# Patient Record
Sex: FEMALE | Race: WHITE | HISPANIC OR LATINO
[De-identification: names, ages, dates, MRNs, and addresses within clinical notes are randomized per-mention and may not be internally consistent; named-entity substitution may affect disease eponyms.]

---

## 2017-05-13 ENCOUNTER — MESSAGE (OUTPATIENT)
Age: 37
End: 2017-05-13

## 2017-05-13 DIAGNOSIS — R39.9 UNSPECIFIED SYMPTOMS AND SIGNS INVOLVING THE GENITOURINARY SYSTEM: ICD-10-CM

## 2017-05-13 RX ORDER — NITROFURANTOIN (MONOHYDRATE/MACROCRYSTALS) 25; 75 MG/1; MG/1
100 CAPSULE ORAL
Qty: 14 | Refills: 0 | Status: ACTIVE | COMMUNITY
Start: 2017-05-13 | End: 1900-01-01

## 2018-01-31 ENCOUNTER — EMERGENCY (EMERGENCY)
Facility: HOSPITAL | Age: 38
LOS: 1 days | Discharge: HOME | End: 2018-01-31
Attending: EMERGENCY MEDICINE | Admitting: EMERGENCY MEDICINE
Payer: COMMERCIAL

## 2018-01-31 VITALS
OXYGEN SATURATION: 99 % | DIASTOLIC BLOOD PRESSURE: 54 MMHG | HEART RATE: 80 BPM | SYSTOLIC BLOOD PRESSURE: 110 MMHG | RESPIRATION RATE: 16 BRPM | TEMPERATURE: 98 F

## 2018-01-31 VITALS
DIASTOLIC BLOOD PRESSURE: 68 MMHG | OXYGEN SATURATION: 100 % | SYSTOLIC BLOOD PRESSURE: 118 MMHG | HEART RATE: 78 BPM | RESPIRATION RATE: 16 BRPM

## 2018-01-31 PROCEDURE — 99284 EMERGENCY DEPT VISIT MOD MDM: CPT | Mod: 25

## 2018-01-31 PROCEDURE — 93010 ELECTROCARDIOGRAM REPORT: CPT

## 2018-01-31 RX ORDER — KETOROLAC TROMETHAMINE 30 MG/ML
30 SYRINGE (ML) INJECTION ONCE
Qty: 0 | Refills: 0 | Status: DISCONTINUED | OUTPATIENT
Start: 2018-01-31 | End: 2018-01-31

## 2018-01-31 RX ADMIN — Medication 30 MILLIGRAM(S): at 19:38

## 2018-01-31 NOTE — ED PROVIDER NOTE - ATTENDING CONTRIBUTION TO CARE
Seen and examined, atraumatic neck pain, able to flex and rotate 45 degrees bilat without pain, +pain with full rotation to R, occ. pain to L chest, transient, lasting seconds, sharp, occurs at rest. Ext. exam strength 5/5 bilat but states occ. arms feel "weak" denies numbness, normal sensory to exam, good distal pulses.

## 2018-01-31 NOTE — ED ADULT TRIAGE NOTE - CHIEF COMPLAINT QUOTE
Pt c/o right arm and neck pain as well as bilateral hand weakness x 2 days. NAD, pt appears comfortable.

## 2018-01-31 NOTE — ED PROVIDER NOTE - MEDICAL DECISION MAKING DETAILS
37 yr old female with hx of Type 1 DM, anxiety presents c/o upper right sided neck pain, with bilateral "hand weakness" x 2 days. Pt also admits to left sided chest stabbing pain, while waiting in the ED, lasting for few secs, no associated symptoms. Pt reports similar episode of chest pain last week.: neurologically intact, no bony tenderness, EKG nsr, pain control, fu with pmd.

## 2018-01-31 NOTE — ED PROVIDER NOTE - PHYSICAL EXAMINATION
spine- no bony tenderness, positive ROM  Neck: positive ROM, no point tenderness   5/5 strength to b/l extremities, no decrease in sensation  Positive strong good pulses in all extremities.

## 2018-01-31 NOTE — ED PROVIDER NOTE - OBJECTIVE STATEMENT
37 yr old female with hx of Type 1 DM, anxiety presents c/o upper right sided neck pain, with bilateral "hand weakness" x 2 days. Pt also admits to left sided chest stabbing pain, while waiting in the ED, lasting for few secs, no associated symptoms. Pt reports similar episode of chest pain last week. Pt denies any other symptoms at this time. Pt has not taking any pain meds today.

## 2019-03-21 NOTE — ED PROVIDER NOTE - PSYCHIATRIC, MLM
Alert and oriented to person, place, time/situation. normal mood and affect. no apparent risk to self or others. Yes

## 2022-03-09 ENCOUNTER — EMERGENCY (EMERGENCY)
Facility: HOSPITAL | Age: 42
LOS: 1 days | Discharge: HOME | End: 2022-03-09
Attending: STUDENT IN AN ORGANIZED HEALTH CARE EDUCATION/TRAINING PROGRAM | Admitting: STUDENT IN AN ORGANIZED HEALTH CARE EDUCATION/TRAINING PROGRAM
Payer: COMMERCIAL

## 2022-03-09 VITALS
SYSTOLIC BLOOD PRESSURE: 118 MMHG | TEMPERATURE: 98 F | HEART RATE: 65 BPM | DIASTOLIC BLOOD PRESSURE: 81 MMHG | OXYGEN SATURATION: 100 % | RESPIRATION RATE: 16 BRPM

## 2022-03-09 VITALS
RESPIRATION RATE: 16 BRPM | SYSTOLIC BLOOD PRESSURE: 109 MMHG | TEMPERATURE: 98 F | DIASTOLIC BLOOD PRESSURE: 76 MMHG | OXYGEN SATURATION: 100 % | HEART RATE: 83 BPM

## 2022-03-09 DIAGNOSIS — Z98.890 OTHER SPECIFIED POSTPROCEDURAL STATES: Chronic | ICD-10-CM

## 2022-03-09 PROBLEM — F41.9 ANXIETY DISORDER, UNSPECIFIED: Chronic | Status: ACTIVE | Noted: 2018-01-31

## 2022-03-09 PROBLEM — E11.9 TYPE 2 DIABETES MELLITUS WITHOUT COMPLICATIONS: Chronic | Status: ACTIVE | Noted: 2018-01-31

## 2022-03-09 PROBLEM — F32.9 MAJOR DEPRESSIVE DISORDER, SINGLE EPISODE, UNSPECIFIED: Chronic | Status: ACTIVE | Noted: 2018-01-31

## 2022-03-09 LAB
ALBUMIN SERPL ELPH-MCNC: 4.4 G/DL — SIGNIFICANT CHANGE UP (ref 3.3–5)
ALP SERPL-CCNC: 56 U/L — SIGNIFICANT CHANGE UP (ref 40–120)
ALT FLD-CCNC: 10 U/L — SIGNIFICANT CHANGE UP (ref 4–33)
ANION GAP SERPL CALC-SCNC: 12 MMOL/L — SIGNIFICANT CHANGE UP (ref 7–14)
AST SERPL-CCNC: 12 U/L — SIGNIFICANT CHANGE UP (ref 4–32)
BASOPHILS # BLD AUTO: 0.04 K/UL — SIGNIFICANT CHANGE UP (ref 0–0.2)
BASOPHILS NFR BLD AUTO: 0.6 % — SIGNIFICANT CHANGE UP (ref 0–2)
BILIRUB SERPL-MCNC: <0.2 MG/DL — SIGNIFICANT CHANGE UP (ref 0.2–1.2)
BUN SERPL-MCNC: 15 MG/DL — SIGNIFICANT CHANGE UP (ref 7–23)
CALCIUM SERPL-MCNC: 9.8 MG/DL — SIGNIFICANT CHANGE UP (ref 8.4–10.5)
CHLORIDE SERPL-SCNC: 101 MMOL/L — SIGNIFICANT CHANGE UP (ref 98–107)
CO2 SERPL-SCNC: 25 MMOL/L — SIGNIFICANT CHANGE UP (ref 22–31)
CREAT SERPL-MCNC: 0.77 MG/DL — SIGNIFICANT CHANGE UP (ref 0.5–1.3)
EGFR: 99 ML/MIN/1.73M2 — SIGNIFICANT CHANGE UP
EOSINOPHIL # BLD AUTO: 0.12 K/UL — SIGNIFICANT CHANGE UP (ref 0–0.5)
EOSINOPHIL NFR BLD AUTO: 1.7 % — SIGNIFICANT CHANGE UP (ref 0–6)
GLUCOSE SERPL-MCNC: 188 MG/DL — HIGH (ref 70–99)
HCG SERPL-ACNC: <5 MIU/ML — SIGNIFICANT CHANGE UP
HCT VFR BLD CALC: 39.1 % — SIGNIFICANT CHANGE UP (ref 34.5–45)
HGB BLD-MCNC: 13.2 G/DL — SIGNIFICANT CHANGE UP (ref 11.5–15.5)
IANC: 3.97 K/UL — SIGNIFICANT CHANGE UP (ref 1.5–8.5)
IMM GRANULOCYTES NFR BLD AUTO: 0.3 % — SIGNIFICANT CHANGE UP (ref 0–1.5)
LIDOCAIN IGE QN: 31 U/L — SIGNIFICANT CHANGE UP (ref 7–60)
LYMPHOCYTES # BLD AUTO: 2.56 K/UL — SIGNIFICANT CHANGE UP (ref 1–3.3)
LYMPHOCYTES # BLD AUTO: 35.3 % — SIGNIFICANT CHANGE UP (ref 13–44)
MCHC RBC-ENTMCNC: 28.6 PG — SIGNIFICANT CHANGE UP (ref 27–34)
MCHC RBC-ENTMCNC: 33.8 GM/DL — SIGNIFICANT CHANGE UP (ref 32–36)
MCV RBC AUTO: 84.6 FL — SIGNIFICANT CHANGE UP (ref 80–100)
MONOCYTES # BLD AUTO: 0.55 K/UL — SIGNIFICANT CHANGE UP (ref 0–0.9)
MONOCYTES NFR BLD AUTO: 7.6 % — SIGNIFICANT CHANGE UP (ref 2–14)
NEUTROPHILS # BLD AUTO: 3.97 K/UL — SIGNIFICANT CHANGE UP (ref 1.8–7.4)
NEUTROPHILS NFR BLD AUTO: 54.5 % — SIGNIFICANT CHANGE UP (ref 43–77)
NRBC # BLD: 0 /100 WBCS — SIGNIFICANT CHANGE UP
NRBC # FLD: 0 K/UL — SIGNIFICANT CHANGE UP
PLATELET # BLD AUTO: 270 K/UL — SIGNIFICANT CHANGE UP (ref 150–400)
POTASSIUM SERPL-MCNC: 4.2 MMOL/L — SIGNIFICANT CHANGE UP (ref 3.5–5.3)
POTASSIUM SERPL-SCNC: 4.2 MMOL/L — SIGNIFICANT CHANGE UP (ref 3.5–5.3)
PROT SERPL-MCNC: 7.3 G/DL — SIGNIFICANT CHANGE UP (ref 6–8.3)
RBC # BLD: 4.62 M/UL — SIGNIFICANT CHANGE UP (ref 3.8–5.2)
RBC # FLD: 12.4 % — SIGNIFICANT CHANGE UP (ref 10.3–14.5)
SODIUM SERPL-SCNC: 138 MMOL/L — SIGNIFICANT CHANGE UP (ref 135–145)
TROPONIN T, HIGH SENSITIVITY RESULT: <6 NG/L — SIGNIFICANT CHANGE UP
WBC # BLD: 7.26 K/UL — SIGNIFICANT CHANGE UP (ref 3.8–10.5)
WBC # FLD AUTO: 7.26 K/UL — SIGNIFICANT CHANGE UP (ref 3.8–10.5)

## 2022-03-09 PROCEDURE — 76705 ECHO EXAM OF ABDOMEN: CPT | Mod: 26

## 2022-03-09 PROCEDURE — 99285 EMERGENCY DEPT VISIT HI MDM: CPT | Mod: 25

## 2022-03-09 PROCEDURE — 93010 ELECTROCARDIOGRAM REPORT: CPT

## 2022-03-09 PROCEDURE — 71046 X-RAY EXAM CHEST 2 VIEWS: CPT | Mod: 26

## 2022-03-09 RX ORDER — SODIUM CHLORIDE 9 MG/ML
1000 INJECTION INTRAMUSCULAR; INTRAVENOUS; SUBCUTANEOUS ONCE
Refills: 0 | Status: COMPLETED | OUTPATIENT
Start: 2022-03-09 | End: 2022-03-09

## 2022-03-09 RX ORDER — FAMOTIDINE 10 MG/ML
20 INJECTION INTRAVENOUS ONCE
Refills: 0 | Status: COMPLETED | OUTPATIENT
Start: 2022-03-09 | End: 2022-03-09

## 2022-03-09 RX ADMIN — FAMOTIDINE 20 MILLIGRAM(S): 10 INJECTION INTRAVENOUS at 17:22

## 2022-03-09 RX ADMIN — SODIUM CHLORIDE 1000 MILLILITER(S): 9 INJECTION INTRAMUSCULAR; INTRAVENOUS; SUBCUTANEOUS at 17:22

## 2022-03-09 RX ADMIN — Medication 30 MILLILITER(S): at 17:22

## 2022-03-09 NOTE — ED PROVIDER NOTE - NSFOLLOWUPINSTRUCTIONS_ED_ALL_ED_FT
Follow up with your Doctor in 1-2 days.  Bring a copy of results   Follow up with Gastroenterology in 1-2 days. (see attached list)  Rest. Drink plenty of fluids.    Take Pepcid 20mg orally once a day.( This medication can be purchased over the counter at any pharmacy)   Return to the ER for persistent/worsening or new symptoms pain, fevers, chills, unable to eat/drink, weakness, dizziness, nausea, vomiting, or any concerning symptoms.

## 2022-03-09 NOTE — ED PROVIDER NOTE - OBJECTIVE STATEMENT
40 y/o female with a hx of DM, gastric ulcers presents to the ER c/o  5 days of epigastric/RUQ pain.  Pt states pain was worse after eating today prompting her visit to the ER .  Pt denies chest pain, shortness of breath, cough, fevers, chills, nausea, vomiting, back pain, weakness, dizziness.

## 2022-03-09 NOTE — ED PROVIDER NOTE - PATIENT PORTAL LINK FT
You can access the FollowMyHealth Patient Portal offered by [facility] by registering at the following website: [email or web address]. By joining [employer]’s FollowMyHealth portal, you will also be able to view your health information using other applications (apps) compatible with our system.

## 2022-03-09 NOTE — ED PROVIDER NOTE - CLINICAL SUMMARY MEDICAL DECISION MAKING FREE TEXT BOX
42 y/o female with a hx of DM, gastric ulcers presents to the ER c/o  5 days of epigastric/RUQ pain.  Pt states pain was worse after eating today prompting her visit to the ER.  Pt is well appearing, NAD, +TTP epigastric/RUQ, will check labs, US, CXR, Pepcid, Maalox, reassess.

## 2022-03-09 NOTE — ED PROVIDER NOTE - NS ED ATTENDING STATEMENT MOD
This was a shared visit with the MEHRDAD. I reviewed and verified the documentation and independently performed the documented:

## 2022-03-09 NOTE — ED PROVIDER NOTE - PROGRESS NOTE DETAILS
GERA Caruso: pt feels better ambulating without difficulty, pt tolerating PO.  Results reviewed with patient.  Discharge reviewed and discussed with patient.

## 2022-03-09 NOTE — ED PROVIDER NOTE - ATTENDING CONTRIBUTION TO CARE
42 yo female with PMH DM, gastric ulcers for evaluation of 5 days of epigastric/RUQ pain. PE: +epigaastric ttp A/P Labs, imaging, medicate, reassess

## 2024-01-08 NOTE — ED ADULT TRIAGE NOTE - ESI TRIAGE ACUITY LEVEL, MLM
Urine culture grew Staphylococcus aureus  Received 3 doses ceftriaxone on admission to the hospital  Pike catheter in place  Encourage p.o. hydration  Patient with increased behaviors for the past 24 hours consider repeat urine analysis with reflex to culture   3

## 2024-02-01 NOTE — ED ADULT NURSE NOTE - NS PRO PASSIVE SMOKE EXP
OUTPATIENT PROGRESS NOTE    HISTORY:    Zoie Rowley is a 71 year old female who comes in for an office visit.    Upper respiratory tract infection, unspecified type  Abnormal chest sounds  Tashia presents today for ongoing upper respiratory disease that started [place] day. She started with a dry cough and fevers the week after Christmas. The fever has now resolved but the cough has progressed to being productive with green phlegm, some shortness of breath with activity, she has significant fatigue, a raspy voice though no sore throat, and frequent headaches. She denies sinus congestion, ear pain, and any further fevers. She was not tested for anything at the onset of her symptoms. She has been taking Tylenol for the headaches and Mucinex-DM though it did not seem to help much.   Assessment and Plan:  Not controlled. I suspect this started with a viral etiology, such as COVID or influenza, but have suspicion for progression to bacterial etiology given duration of, and worsening, of symptoms. Will prescribe an albuterol inhaler to use [id number] PRN and prescribe azithromycin course to start. Will complete a CXR and if it is suspicious for pneumonia, will add on Augmentin 875-125 mg PO BID for 10 days. Discussed a steroid burst but, considering she has prediabetes, will defer at this time.   - albuterol 108 (90 Base) MCG/ACT inhaler; Inhale 2 puffs into the lungs every 4 hours as needed for Shortness of Breath or Wheezing.  Dispense: 1 each; Refill: 3  - azithromycin (ZITHROMAX) 250 MG tablet; Take 2 tablets by mouth on day 1, then 1 tablet by mouth days 2-5  Dispense: 6 tablet; Refill: 0  - XR CHEST PA AND LATERAL 2 VIEWS; Future    MEDICATIONS:  Medications were reviewed and updated today.  Current Outpatient Medications   Medication Sig    albuterol 108 (90 Base) MCG/ACT inhaler Inhale 2 puffs into the lungs every 4 hours as needed for Shortness of Breath or Wheezing.    azithromycin (ZITHROMAX) 250 MG  tablet Take 2 tablets by mouth on day 1, then 1 tablet by mouth days 2-5    famotidine (PEPCID) 10 MG tablet Take 1 tablet by mouth in the morning and 1 tablet in the evening.    Alum Hydroxide-Mag Carbonate (GAVISCON PO) Dosage unknown    rosuvastatin (CRESTOR) 40 MG tablet Take 1 tablet by mouth daily.    rivaroxaban (Xarelto) 20 MG Tab Take 1 tablet by mouth daily (with dinner).    omeprazole (PriLOSEC) 40 MG capsule Take 1 capsule by mouth daily.    metoPROLOL tartrate (LOPRESSOR) 25 MG tablet Take 1 tablet by mouth daily.    lisinopril (ZESTRIL) 5 MG tablet Take 1 tablet by mouth daily.    Coenzyme Q10 100 MG Tab Take 1 tablet by mouth daily.    Cholecalciferol (Vitamin D-3) 25 mcg (1,000 units) capsule Take 1 capsule by mouth daily.    Aspirin 81 MG Cap Take 81 mg by mouth daily.    fluticasone (FLONASE) 50 MCG/ACT nasal spray Spray 2 sprays in each nostril daily.    calcium carbonate (TUMS) 500 MG chewable tablet Chew 2 tablets by mouth as needed for Heartburn.    acetaminophen (TYLENOL) 500 MG tablet Take 500 mg by mouth every 6 hours as needed for Pain or Fever.     nitroGLYcerin (NITROSTAT) 0.4 MG SL tablet Place 0.4 mg under the tongue every 5 minutes as needed for Chest pain.      No current facility-administered medications for this visit.     ALLERGIES:  Allergies as of 02/01/2024 - Reviewed 02/01/2024   Allergen Reaction Noted    Statins Other (See Comments) 10/25/2018     Review of systems:    See HPI above    PHYSICAL EXAMINATION:  Vitals: Blood pressure 126/80, pulse 65, temperature 98.3 °F (36.8 °C), temperature source Oral, resp. rate 20, height 5' 4.5\" (1.638 m), weight 93.9 kg (207 lb), SpO2 95 %.  Wt Readings from Last 3 Encounters:   02/01/24 93.9 kg (207 lb)   11/28/23 93 kg (205 lb)   06/01/23 90.7 kg (200 lb)      General:  An ill-appearing 71 year old female in no apparent distress.  Patient is alert, awake, oriented times 3.  Recent and remote memory appears intact.   ENT:  Pupils equal,  round, reactive to light and accommodation.  Normal brows and lashes. Sclerae without icterus. Conjunctivae are clear without injection.  No maxillary or frontal nasal sinus tenderness. Ear canals are clear of cerumen.  Bilateral tympanic membranes are pearly gray without effusion or perforation. Posterior oropharynx is free from ulcers, thrush or erythema. Uvula rises midline on phonation.   Cardiac:  Regular rate and rhythm.  No murmur.   Respiratory:  Regular rate and depth of breathing.  Lung sounds diminished throughout and scattered wheezing and rhonchi auscultated throughout.  No crackles appreciated.   Psychiatric:  Cooperative. Appropriate mood and affect. Normal judgment.      Farnaz Garza, DNP, RN, APRN, FNP-BC   No